# Patient Record
Sex: FEMALE | NOT HISPANIC OR LATINO | URBAN - METROPOLITAN AREA
[De-identification: names, ages, dates, MRNs, and addresses within clinical notes are randomized per-mention and may not be internally consistent; named-entity substitution may affect disease eponyms.]

---

## 2023-03-10 ENCOUNTER — APPOINTMENT (RX ONLY)
Dept: URBAN - METROPOLITAN AREA CLINIC 101 | Facility: CLINIC | Age: 53
Setting detail: DERMATOLOGY
End: 2023-03-10

## 2023-03-10 DIAGNOSIS — Z41.9 ENCOUNTER FOR PROCEDURE FOR PURPOSES OTHER THAN REMEDYING HEALTH STATE, UNSPECIFIED: ICD-10-CM

## 2023-03-10 PROCEDURE — ? PRE-OP WORKLIST

## 2023-03-10 PROCEDURE — ? ORDER TESTS

## 2023-03-10 NOTE — PROCEDURE: PRE-OP WORKLIST
Detail Level: Simple
Surgery Scheduled: Breast lift, Tummy praveen\\nRemote pre op sent 3/10/2023
Estimated Length Of Stay: 1-2 Hours
Coordination With:: Carmen
Surgeon: Trinidad
Date Of Surgery: 4/17/23
Recovery Facility: Pending sale to [facility]
Photos Taken?: no
Admission Status: outpatient

## 2023-03-15 ENCOUNTER — RX ONLY (OUTPATIENT)
Age: 53
Setting detail: RX ONLY
End: 2023-03-15

## 2023-03-15 RX ORDER — CYCLOBENZAPRINE HYDROCHLORIDE 5 MG/1
TABLET, FILM COATED ORAL
Qty: 15 | Refills: 1 | Status: ERX | COMMUNITY
Start: 2023-03-15

## 2023-03-15 RX ORDER — PROMETHAZINE HYDROCHLORIDE 12.5 MG/1
TABLET ORAL
Qty: 20 | Refills: 1 | Status: ERX | COMMUNITY
Start: 2023-03-15

## 2023-03-15 RX ORDER — CEPHALEXIN 500 MG/1
CAPSULE ORAL
Qty: 28 | Refills: 2 | Status: ERX | COMMUNITY
Start: 2023-03-15

## 2023-03-15 RX ORDER — OXYCODONE HYDROCHLORIDE AND ACETAMINOPHEN 5; 325 MG/1; MG/1
TABLET ORAL
Qty: 30 | Refills: 0 | Status: ERX | COMMUNITY
Start: 2023-03-15

## 2023-04-17 ENCOUNTER — APPOINTMENT (RX ONLY)
Dept: URBAN - METROPOLITAN AREA CLINIC 101 | Facility: CLINIC | Age: 53
Setting detail: DERMATOLOGY
End: 2023-04-17

## 2023-04-17 DIAGNOSIS — Z41.9 ENCOUNTER FOR PROCEDURE FOR PURPOSES OTHER THAN REMEDYING HEALTH STATE, UNSPECIFIED: ICD-10-CM

## 2023-04-17 PROCEDURE — ? OPERATIVE NOTE - BRIEF

## 2023-04-17 NOTE — PROCEDURE: OPERATIVE NOTE - BRIEF
Surgeon: Dr. Braxton Real
Additional Epidermal Closure: horizontal mattress
Detail Level: Detailed
Position: supine
Estimated Blood Loss (Cc): minimal
Surgeon: Dr. Singh Santillan

## 2023-04-18 ENCOUNTER — APPOINTMENT (RX ONLY)
Dept: URBAN - METROPOLITAN AREA CLINIC 101 | Facility: CLINIC | Age: 53
Setting detail: DERMATOLOGY
End: 2023-04-18

## 2023-04-18 DIAGNOSIS — Z41.9 ENCOUNTER FOR PROCEDURE FOR PURPOSES OTHER THAN REMEDYING HEALTH STATE, UNSPECIFIED: ICD-10-CM

## 2023-04-18 PROCEDURE — 99024 POSTOP FOLLOW-UP VISIT: CPT

## 2023-04-18 PROCEDURE — ? COUNSELING - POST-OP CHECK

## 2023-04-21 ENCOUNTER — APPOINTMENT (RX ONLY)
Dept: URBAN - METROPOLITAN AREA CLINIC 101 | Facility: CLINIC | Age: 53
Setting detail: DERMATOLOGY
End: 2023-04-21

## 2023-04-21 DIAGNOSIS — Z41.9 ENCOUNTER FOR PROCEDURE FOR PURPOSES OTHER THAN REMEDYING HEALTH STATE, UNSPECIFIED: ICD-10-CM

## 2023-04-21 PROCEDURE — ? COUNSELING - POST-OP CHECK

## 2023-04-21 PROCEDURE — 99024 POSTOP FOLLOW-UP VISIT: CPT

## 2023-05-30 ENCOUNTER — APPOINTMENT (RX ONLY)
Dept: URBAN - METROPOLITAN AREA CLINIC 101 | Facility: CLINIC | Age: 53
Setting detail: DERMATOLOGY
End: 2023-05-30

## 2023-07-27 ENCOUNTER — APPOINTMENT (RX ONLY)
Dept: URBAN - METROPOLITAN AREA CLINIC 101 | Facility: CLINIC | Age: 53
Setting detail: DERMATOLOGY
End: 2023-07-27

## 2023-07-27 PROCEDURE — ? ADDITIONAL NOTES

## 2023-07-27 NOTE — PROCEDURE: ADDITIONAL NOTES
Additional Notes: Patient's concerns include: \\n-  Desire for improved thigh and flank contour\\n- Desire for improved submental region \\n\\nFactors altering surgical decisions (hx/exam findings): \\n- Lipodystrophy of bilateral thighs and flanks \\n- Cellulite and some stretch marks present on posterior thighs with amount of skin laxity difficult to determine based on photo\\n\\nProposed intervention(s):\\n- Recommend liposuction of bilateral thighs, flanks, and submental area. Discussed limitations of surgery including inability to remove cellulitue and stretch marks or correct all degrees of laxity or fullness, which the patient understands. The patient will be provided with pricing info for the procedures discussed.    \\n- Discussed stab incision sites and the specific procedures in detail as well as activity restrictions for 4-6 weeks, early ambulation required, and use of compression following surgery x6 weeks\\n- Discussed risks of surgery including Infection, bleeding, scar, need for future surgery, damage to nearby structures, contour irregularities, seroma, hematoma, skin necrosis, postop deformity, pain, DVT/PE, wound care, asymmetry, numbness/sensory changes, edema, lymphedema, poor cosmetic result, inadequate skin retraction, and persistent deformity\\n- Will plan to perform in person exam and take pre-op pictures prior to any surgical intervention to confirm the suspected findings and the surgical plan
Render Risk Assessment In Note?: no

## 2023-11-16 ENCOUNTER — APPOINTMENT (RX ONLY)
Dept: URBAN - METROPOLITAN AREA CLINIC 304 | Facility: CLINIC | Age: 53
Setting detail: DERMATOLOGY
End: 2023-11-16

## 2023-11-16 DIAGNOSIS — L90.8 OTHER ATROPHIC DISORDERS OF SKIN: ICD-10-CM

## 2023-11-16 PROCEDURE — ? COUNSELING

## 2023-11-16 PROCEDURE — ? SOFWAVE

## 2023-11-16 ASSESSMENT — LOCATION DETAILED DESCRIPTION DERM
LOCATION DETAILED: LEFT SUPERIOR ANTERIOR NECK
LOCATION DETAILED: RIGHT SUPERIOR ANTERIOR NECK
LOCATION DETAILED: RIGHT INFERIOR LATERAL NECK
LOCATION DETAILED: LEFT SUPERIOR LATERAL NECK
LOCATION DETAILED: LEFT INFERIOR LATERAL NECK

## 2023-11-16 ASSESSMENT — LOCATION ZONE DERM: LOCATION ZONE: NECK

## 2023-11-16 ASSESSMENT — LOCATION SIMPLE DESCRIPTION DERM
LOCATION SIMPLE: LEFT ANTERIOR NECK
LOCATION SIMPLE: RIGHT ANTERIOR NECK

## 2023-11-16 NOTE — PROCEDURE: SOFWAVE
Price $ (Use Numbers Only, No Text Please.): 0
Detail Level: Simple
Treatment Administered By (Optional): Anurag Kendrick PA-C
Topical Anesthesia?: yes
Topical Anesthetic Time (In Min): 30
Pre-Procedure: Prior to proceeding the treatment areas were cleaned and gel was applied.
Post-Procedure Care: Normal mild skin redness was seen.
Device: MEDArchon
Treatment Number: 1
Location: neck
Energy (Joules): 3.4
Pulse Duration (In Sec): 5.0
Post Cooling (In Sec): 2.0
Consent: Written consent obtained, risks reviewed including but not limited to crusting, scabbing, blistering, scarring, darker or lighter pigmentary change, need for addtional treatments, and incomplete result.
Post-Care Instructions: I reviewed with the patient in detail post-care instructions.

## 2023-12-06 ENCOUNTER — APPOINTMENT (RX ONLY)
Dept: URBAN - METROPOLITAN AREA CLINIC 307 | Facility: CLINIC | Age: 53
Setting detail: DERMATOLOGY
End: 2023-12-06

## 2023-12-20 ENCOUNTER — APPOINTMENT (RX ONLY)
Dept: URBAN - METROPOLITAN AREA CLINIC 292 | Facility: CLINIC | Age: 53
Setting detail: DERMATOLOGY
End: 2023-12-20

## 2023-12-20 DIAGNOSIS — L98.8 OTHER SPECIFIED DISORDERS OF THE SKIN AND SUBCUTANEOUS TISSUE: ICD-10-CM

## 2023-12-20 PROCEDURE — ? ADDITIONAL NOTES

## 2023-12-20 PROCEDURE — ? JUVEDERM VOLUMA XC INJECTION

## 2023-12-20 PROCEDURE — ? BOTOX

## 2023-12-20 ASSESSMENT — LOCATION ZONE DERM: LOCATION ZONE: FACE

## 2023-12-20 ASSESSMENT — LOCATION DETAILED DESCRIPTION DERM: LOCATION DETAILED: GLABELLA

## 2023-12-20 ASSESSMENT — LOCATION SIMPLE DESCRIPTION DERM: LOCATION SIMPLE: GLABELLA

## 2023-12-20 NOTE — PROCEDURE: JUVEDERM VOLUMA XC INJECTION
Lateral Face Filler Volume In Cc: 0
Additional Anesthesia Volume In Cc: 6
Expiration Date (Month Year): 2023-12-23
Use Map Statement For Sites (Optional): No
Number Of Syringes (Required For Inventory): 1
Detail Level: Detailed
Procedural Text: The filler was administered to the treatment areas noted above.
Marionette Lines Filler Volume In Cc: 0.5
Map Statment: See Attach Map for Complete Details
Lot #: [id number]
Consent: Written consent obtained. Risks include but not limited to bruising, beading, irregular texture, ulceration, infection, allergic reaction, scar formation, incomplete augmentation, temporary nature, procedural pain.
Post-Care Instructions: Patient instructed to apply ice to reduce swelling.
Filler: Juvederm Voluma XC
Jawline Filler Volume In Cc: 0.4
Anesthesia Type: 1% lidocaine with epinephrine

## 2023-12-20 NOTE — HPI: COSMETIC (BOTOX)
[id number]: previous_has_had_botox
Additional History: She last had botox 8 years ago. No recent botox administration. \\n\\nNo history of previous filler.

## 2023-12-20 NOTE — PROCEDURE: BOTOX
Periorbital Skin Units: 0
Show Depressor Anguli Units: Yes
Show Right And Left Pupillary Line Units: No
Dilution (U/0.1 Cc): 4
Forehead Units: 11
Price (Use Numbers Only, No Special Characters Or $): 241.10
Post-Care Instructions: Patient instructed to not lie down for 4 hours and limit physical activity for 24 hours.
Consent: Written consent obtained. Risks include but not limited to lid/brow ptosis, bruising, swelling, diplopia, temporary effect, incomplete chemical denervation.
Additional Area 1 Location: Tail of right brow
Periorbital Skin Units: 12
Patient Specific Comments (Will Not Stick From Patient To Patient): New patient special offered- 20U. \\nGlabella 20U. \\n30% off employee discount off $15.
Incrementing Botox Units: By 0.5 Units
Lot #: [id number]
Detail Level: Detailed
Expiration Date (Month Year): 2026/04

## 2023-12-20 NOTE — PROCEDURE: ADDITIONAL NOTES
Detail Level: Simple
Additional Notes: Sample filler provided- no charge to patient today.
Render Risk Assessment In Note?: no

## 2024-09-24 ENCOUNTER — RX ONLY (OUTPATIENT)
Age: 54
Setting detail: RX ONLY
End: 2024-09-24

## 2024-09-24 RX ORDER — SPIRONOLACTONE 25 MG/1
TABLET, FILM COATED ORAL DAILY
Qty: 90 | Refills: 4 | Status: ERX | COMMUNITY
Start: 2024-09-24

## 2024-12-20 ENCOUNTER — APPOINTMENT (OUTPATIENT)
Dept: URBAN - METROPOLITAN AREA CLINIC 307 | Facility: CLINIC | Age: 54
Setting detail: DERMATOLOGY
End: 2024-12-20

## 2024-12-20 DIAGNOSIS — Z41.9 ENCOUNTER FOR PROCEDURE FOR PURPOSES OTHER THAN REMEDYING HEALTH STATE, UNSPECIFIED: ICD-10-CM

## 2024-12-20 PROCEDURE — ? PLATINUM HYDRAFACIAL

## 2024-12-20 ASSESSMENT — LOCATION SIMPLE DESCRIPTION DERM: LOCATION SIMPLE: INFERIOR FOREHEAD

## 2024-12-20 ASSESSMENT — LOCATION ZONE DERM: LOCATION ZONE: FACE

## 2024-12-20 ASSESSMENT — LOCATION DETAILED DESCRIPTION DERM: LOCATION DETAILED: INFERIOR MID FOREHEAD

## 2024-12-20 NOTE — PROCEDURE: PLATINUM HYDRAFACIAL
Vacuum Pressure High Setting (Will Not Render If Set To 0): 0
Procedure: Fusion
Solution Override: Brightalive Booster
Vacuum Pressure Low Setting (Will Not Render If Set To 0): 23
Procedure: Boost
Lymphatic Therapy: yes
Procedure: Extraction
Location: face
Procedure: Peel
Number Of Passes: 2
Solution: Activ-4
Vacuum Pressure Low Setting (Will Not Render If Set To 0): 16
Solution Override
Tip: Hydropeel Tip, Blue
Procedure: Extend and Protect
Consent: Written consent obtained, risks reviewed including but not limited to crusting, scabbing, blistering, scarring, darker or lighter pigmentary change, bruising, and/or incomplete response.
Treatment Number: 1
Tip: Hydropeel Tip, Teal
Tip: Hydropeel Tip, Clear
Indication: anti-aging
Solution: Beta-HD
Procedure: Exfoliation
Solution: GlySal 7.5%
Post-Care Instructions: I reviewed with the patient in detail post-care instructions. Patient should stay away from the sun and wear sun protection until treated areas are fully healed.
Tip Override

## 2024-12-20 NOTE — HPI: DRY SKIN
Is This A New Presentation Or A Follow-Up?: Dry Skin
How Severe Is Your Dry Skin?: moderate
How Many Showers Or Baths Do You Take In One Day?: 1
Additional History: Client mentions they feel their skin is dry and dull, lacking hydration. Client also mentions they would like to improve overall tone, texture, and laicity for face and neck

## 2025-03-22 ENCOUNTER — APPOINTMENT (OUTPATIENT)
Dept: URBAN - METROPOLITAN AREA CLINIC 292 | Facility: CLINIC | Age: 55
Setting detail: DERMATOLOGY
End: 2025-03-22

## 2025-03-22 DIAGNOSIS — Z41.9 ENCOUNTER FOR PROCEDURE FOR PURPOSES OTHER THAN REMEDYING HEALTH STATE, UNSPECIFIED: ICD-10-CM

## 2025-03-22 PROCEDURE — ? ADDITIONAL NOTES

## 2025-03-22 PROCEDURE — ? SCULPTRA

## 2025-03-22 PROCEDURE — ? JUVEDERM VOLUMA XC INJECTION

## 2025-03-22 ASSESSMENT — LOCATION ZONE DERM: LOCATION ZONE: FACE

## 2025-03-22 ASSESSMENT — LOCATION DETAILED DESCRIPTION DERM
LOCATION DETAILED: RIGHT CENTRAL MALAR CHEEK
LOCATION DETAILED: LEFT INFERIOR CENTRAL MALAR CHEEK

## 2025-03-22 ASSESSMENT — LOCATION SIMPLE DESCRIPTION DERM
LOCATION SIMPLE: RIGHT CHEEK
LOCATION SIMPLE: LEFT CHEEK

## 2025-04-03 ENCOUNTER — RX ONLY (RX ONLY)
Age: 55
End: 2025-04-03

## 2025-04-03 RX ORDER — MINOXIDIL 2.5 MG/1
TABLET ORAL
Qty: 45 | Refills: 5 | Status: ERX | COMMUNITY
Start: 2025-04-03

## 2025-05-02 ENCOUNTER — APPOINTMENT (OUTPATIENT)
Dept: URBAN - METROPOLITAN AREA CLINIC 292 | Facility: CLINIC | Age: 55
Setting detail: DERMATOLOGY
End: 2025-05-02

## 2025-05-02 DIAGNOSIS — Z41.9 ENCOUNTER FOR PROCEDURE FOR PURPOSES OTHER THAN REMEDYING HEALTH STATE, UNSPECIFIED: ICD-10-CM

## 2025-05-02 PROCEDURE — ? ADDITIONAL NOTES

## 2025-05-02 PROCEDURE — ? SCULPTRA

## 2025-05-02 ASSESSMENT — LOCATION SIMPLE DESCRIPTION DERM
LOCATION SIMPLE: LEFT ANTERIOR NECK
LOCATION SIMPLE: LEFT SUBMANDIBULAR AREA
LOCATION SIMPLE: RIGHT SUBMANDIBULAR AREA
LOCATION SIMPLE: RIGHT ANTERIOR NECK

## 2025-05-02 ASSESSMENT — LOCATION ZONE DERM
LOCATION ZONE: FACE
LOCATION ZONE: NECK

## 2025-05-02 ASSESSMENT — LOCATION DETAILED DESCRIPTION DERM
LOCATION DETAILED: RIGHT SUBMANDIBULAR AREA
LOCATION DETAILED: LEFT SUBMANDIBULAR AREA
LOCATION DETAILED: LEFT SUPERIOR ANTERIOR NECK
LOCATION DETAILED: RIGHT SUPERIOR ANTERIOR NECK

## 2025-05-02 NOTE — PROCEDURE: SCULPTRA
Injection Technique: The Sculptra was injected to the listed areas after cleansing the skin and providing appropriate anesthesia.
Show Jawline Volume?: Yes
Left Buccal Filler Volume In Cc: 0
Use Map Statement For Sites (Optional): No
Consent: Written consent obtained. Risks include but not limited to bruising, beading, irregular texture, ulceration, infection, allergic reaction, scar formation, incomplete augmentation, temporary nature, procedural pain.
Map Statement: See Attached Map for Complete Details.
Treatment Number: 2
Lot #: [id number]
Additional Area 1 Location: Neck
Post-Care Instructions: Patient instructed to apply ice to reduce swelling.
Additional Area 1 Volume In Cc: 8
Expiration Date (Month Year): 05/31/2027
Topical Anesthesia?: 23% lidocaine, 7% tetracaine
Price (Use Numbers Only, No Special Characters Or $): 900.0
Detail Level: Detailed
Dilution Method: The Sculptra was diluted with 5 ml of sterile water for a total volume of 9ccs for each vial.

## 2025-06-20 ENCOUNTER — APPOINTMENT (OUTPATIENT)
Dept: URBAN - METROPOLITAN AREA CLINIC 288 | Facility: CLINIC | Age: 55
Setting detail: DERMATOLOGY
End: 2025-06-20

## 2025-06-20 DIAGNOSIS — Z41.9 ENCOUNTER FOR PROCEDURE FOR PURPOSES OTHER THAN REMEDYING HEALTH STATE, UNSPECIFIED: ICD-10-CM

## 2025-06-20 PROCEDURE — ? MICRONEEDLING

## 2025-06-20 PROCEDURE — ? COSMETIC CONSULTATION: MICRONEEDLING

## 2025-06-20 ASSESSMENT — LOCATION SIMPLE DESCRIPTION DERM: LOCATION SIMPLE: LEFT FOREHEAD

## 2025-06-20 ASSESSMENT — LOCATION ZONE DERM: LOCATION ZONE: FACE

## 2025-06-20 ASSESSMENT — LOCATION DETAILED DESCRIPTION DERM: LOCATION DETAILED: LEFT MEDIAL FOREHEAD

## 2025-06-20 NOTE — PROCEDURE: MICRONEEDLING
Depth In Mm (Location #7): 0.1
Price (Use Numbers Only, No Special Characters Or $): 0
Location #3: Chin and Nose
Location #1: Forehead
Depth In Mm (Location #1): 1.5
Treatment Number (Optional): 1
Location #2: Cheeks
Consent: Written consent obtained, risks reviewed including but not limited to pain, scarring, infection and incomplete improvement.  Patient understands the procedure is cosmetic in nature and will require out of pocket payment. Pt denies history of immunosuppression, PIH and keloids.
Topical Anesthesia?: BLT cream (benzocaine 10%, lidocaine 4%, tetracaine 2%)
Depth In Mm (Location #3): 0.5
Length Of Topical Anesthesia Application (Optional): 30 minutes
Depth In Mm (Location #2): 2
Post-Care Instructions: After the procedure, take precautions against sun exposure. I discussed with pt that if they have a history of cold sores they will need to begin prophylactic medication today to prevent a flare. Do not apply make-up for 12 hours after the procedure. Avoid alcohol based toners, retinols and vitamin C for 10-14 days. Pt was encouraged to use lots of aquaphor, biopeptide and hydrating moisturizers. I recommended our replenishing lipid cream.
Detail Level: Zone